# Patient Record
Sex: MALE | Race: WHITE | NOT HISPANIC OR LATINO | Employment: OTHER | URBAN - METROPOLITAN AREA
[De-identification: names, ages, dates, MRNs, and addresses within clinical notes are randomized per-mention and may not be internally consistent; named-entity substitution may affect disease eponyms.]

---

## 2017-05-01 ENCOUNTER — FOLLOW UP (OUTPATIENT)
Dept: URBAN - METROPOLITAN AREA CLINIC 33 | Facility: CLINIC | Age: 82
End: 2017-05-01

## 2017-05-01 VITALS
DIASTOLIC BLOOD PRESSURE: 62 MMHG | HEART RATE: 66 BPM | WEIGHT: 175 LBS | SYSTOLIC BLOOD PRESSURE: 110 MMHG | BODY MASS INDEX: 25.05 KG/M2 | HEIGHT: 70 IN

## 2017-05-01 DIAGNOSIS — H02.833: ICD-10-CM

## 2017-05-01 DIAGNOSIS — H35.3111: ICD-10-CM

## 2017-05-01 DIAGNOSIS — B39.4: ICD-10-CM

## 2017-05-01 DIAGNOSIS — H43.393: ICD-10-CM

## 2017-05-01 DIAGNOSIS — H01.006: ICD-10-CM

## 2017-05-01 DIAGNOSIS — H01.003: ICD-10-CM

## 2017-05-01 DIAGNOSIS — H02.836: ICD-10-CM

## 2017-05-01 DIAGNOSIS — H35.3222: ICD-10-CM

## 2017-05-01 DIAGNOSIS — H04.122: ICD-10-CM

## 2017-05-01 PROCEDURE — 92235 FLUORESCEIN ANGRPH MLTIFRAME: CPT

## 2017-05-01 PROCEDURE — 92014 COMPRE OPH EXAM EST PT 1/>: CPT

## 2017-05-01 PROCEDURE — G8420 CALC BMI NORM PARAMETERS: HCPCS

## 2017-05-01 PROCEDURE — 92250 FUNDUS PHOTOGRAPHY W/I&R: CPT

## 2017-05-01 PROCEDURE — 2019F DILATED MACUL EXAM DONE: CPT

## 2017-05-01 PROCEDURE — 4177F TALK PT/CRGVR RE AREDS PREV: CPT

## 2017-05-01 PROCEDURE — G8427 DOCREV CUR MEDS BY ELIG CLIN: HCPCS

## 2017-05-01 PROCEDURE — 1036F TOBACCO NON-USER: CPT

## 2017-05-01 ASSESSMENT — TONOMETRY
OS_IOP_MMHG: 16
OD_IOP_MMHG: 10

## 2017-05-01 ASSESSMENT — VISUAL ACUITY
OD_CC: 20/15-2
OS_CC: CF 5FT

## 2017-05-03 ENCOUNTER — APPOINTMENT (RX ONLY)
Dept: URBAN - METROPOLITAN AREA CLINIC 123 | Facility: CLINIC | Age: 82
Setting detail: DERMATOLOGY
End: 2017-05-03

## 2017-05-03 DIAGNOSIS — L70.8 OTHER ACNE: ICD-10-CM

## 2017-05-03 DIAGNOSIS — L72.0 EPIDERMAL CYST: ICD-10-CM

## 2017-05-03 DIAGNOSIS — L82.0 INFLAMED SEBORRHEIC KERATOSIS: ICD-10-CM

## 2017-05-03 DIAGNOSIS — L82.1 OTHER SEBORRHEIC KERATOSIS: ICD-10-CM

## 2017-05-03 PROBLEM — M12.9 ARTHROPATHY, UNSPECIFIED: Status: ACTIVE | Noted: 2017-05-03

## 2017-05-03 PROBLEM — I25.10 ATHEROSCLEROTIC HEART DISEASE OF NATIVE CORONARY ARTERY WITHOUT ANGINA PECTORIS: Status: ACTIVE | Noted: 2017-05-03

## 2017-05-03 PROBLEM — D48.5 NEOPLASM OF UNCERTAIN BEHAVIOR OF SKIN: Status: ACTIVE | Noted: 2017-05-03

## 2017-05-03 PROBLEM — E78.5 HYPERLIPIDEMIA, UNSPECIFIED: Status: ACTIVE | Noted: 2017-05-03

## 2017-05-03 PROCEDURE — ? COUNSELING

## 2017-05-03 PROCEDURE — 99203 OFFICE O/P NEW LOW 30 MIN: CPT | Mod: 25

## 2017-05-03 PROCEDURE — 11101: CPT

## 2017-05-03 PROCEDURE — ? TREATMENT REGIMEN

## 2017-05-03 PROCEDURE — ? BIOPSY BY SHAVE METHOD

## 2017-05-03 PROCEDURE — 11100: CPT

## 2017-05-03 ASSESSMENT — LOCATION SIMPLE DESCRIPTION DERM
LOCATION SIMPLE: LEFT CHEEK
LOCATION SIMPLE: RIGHT UPPER BACK
LOCATION SIMPLE: SCALP
LOCATION SIMPLE: LEFT EAR

## 2017-05-03 ASSESSMENT — LOCATION ZONE DERM
LOCATION ZONE: FACE
LOCATION ZONE: TRUNK
LOCATION ZONE: SCALP
LOCATION ZONE: EAR

## 2017-05-03 ASSESSMENT — LOCATION DETAILED DESCRIPTION DERM
LOCATION DETAILED: LEFT INFERIOR PREAURICULAR CHEEK
LOCATION DETAILED: LEFT SUPERIOR MEDIAL MALAR CHEEK
LOCATION DETAILED: LEFT CENTRAL PARIETAL SCALP
LOCATION DETAILED: LEFT ANTERIOR EARLOBE
LOCATION DETAILED: RIGHT MID-UPPER BACK

## 2017-05-03 NOTE — HPI: SKIN LESION
How Severe Is Your Skin Lesion?: mild
Has Your Skin Lesion Been Treated?: not been treated
Is This A New Presentation, Or A Follow-Up?: Growth
Additional History:  has sprayed it multiple times.

## 2017-05-03 NOTE — PROCEDURE: BIOPSY BY SHAVE METHOD
Cryotherapy Text: The wound bed was treated with cryotherapy after the biopsy was performed.
Post-Care Instructions: I reviewed with the patient in detail post-care instructions. Patient is wash site daily with soap and water and apply vaseline until healed.
Consent: Written consent was obtained and risks were reviewed including but not limited to scarring, infection, bleeding, scabbing, incomplete removal, nerve damage and allergy to anesthesia.
Anesthesia Type: 1% lidocaine with 1:100,000 epinephrine and a 1:10 solution of 8.4% sodium bicarbonate
Hemostasis: Drysol
Biopsy Method: Dermablade
Body Location Override (Optional - Billing Will Still Be Based On Selected Body Map Location If Applicable): Left preauricular
Destruction After The Procedure: No
Electrodesiccation And Curettage Text: The wound bed was treated with electrodesiccation and curettage after the biopsy was performed.
Billing Type: [employer]
Electrodesiccation Text: The wound bed was treated with electrodesiccation after the biopsy was performed.
Biopsy Type: H and E
Wound Care: Vaseline
Detail Level: Detailed
Size Of Lesion In Cm: 0
Notification Instructions: Patient will be notified of biopsy results. However, patient instructed to call the office if not contacted within 2 weeks.
Lab: [facility]
Silver Nitrate Text: The wound bed was treated with silver nitrate after the biopsy was performed.
Triangulation (Location Of Lesion In Relation To Distance From Anatomical Landmarks): Bevely Stacks
Lab Facility: [address] [facility]
Anticipated Plan (Based On Presumed Biopsy Results): Nita Lopez
Dressing: bandage
Curettage Text: The wound bed was treated with curettage after the biopsy was performed.
Anesthesia Volume In Cc (Will Not Render If 0): 0.5
Type Of Destruction Used: Curettage
Billing Type: Third-Party Bill
Body Location Override (Optional - Billing Will Still Be Based On Selected Body Map Location If Applicable): Left lateral forehead
Anticipated Plan (Based On Presumed Biopsy Results): Claudetta Oas
Lab Facility: [id number]
Lab: [id number]
Triangulation (Location Of Lesion In Relation To Distance From Anatomical Landmarks): Elinor Dey
Body Location Override (Optional - Billing Will Still Be Based On Selected Body Map Location If Applicable): Left crown
Anticipated Plan (Based On Presumed Biopsy Results): P
Body Location Override (Optional - Billing Will Still Be Based On Selected Body Map Location If Applicable): Right inferior forehead
Anticipated Plan (Based On Presumed Biopsy Results): Courtney Schulte
Triangulation (Location Of Lesion In Relation To Distance From Anatomical Landmarks): Марина Granados
Triangulation (Location Of Lesion In Relation To Distance From Anatomical Landmarks): Dashawn Andrews
Body Location Override (Optional - Billing Will Still Be Based On Selected Body Map Location If Applicable): Right mid eyebrow
Anticipated Plan (Based On Presumed Biopsy Results): Mala Alvarenga

## 2017-05-16 ENCOUNTER — APPOINTMENT (RX ONLY)
Dept: URBAN - METROPOLITAN AREA CLINIC 123 | Facility: CLINIC | Age: 82
Setting detail: DERMATOLOGY
End: 2017-05-16

## 2017-05-16 DIAGNOSIS — L57.0 ACTINIC KERATOSIS: ICD-10-CM

## 2017-05-16 PROCEDURE — 17003 DESTRUCT PREMALG LES 2-14: CPT

## 2017-05-16 PROCEDURE — ? LIQUID NITROGEN

## 2017-05-16 PROCEDURE — 17000 DESTRUCT PREMALG LESION: CPT

## 2017-05-16 PROCEDURE — ? COUNSELING

## 2017-05-16 ASSESSMENT — LOCATION SIMPLE DESCRIPTION DERM
LOCATION SIMPLE: RIGHT FOREHEAD
LOCATION SIMPLE: LEFT FOREHEAD
LOCATION SIMPLE: NOSE

## 2017-05-16 ASSESSMENT — LOCATION DETAILED DESCRIPTION DERM
LOCATION DETAILED: LEFT LATERAL FOREHEAD
LOCATION DETAILED: RIGHT SUPERIOR FOREHEAD
LOCATION DETAILED: NASAL SUPRATIP
LOCATION DETAILED: LEFT SUPERIOR FOREHEAD

## 2017-05-16 ASSESSMENT — LOCATION ZONE DERM
LOCATION ZONE: FACE
LOCATION ZONE: NOSE

## 2017-05-16 NOTE — PROCEDURE: LIQUID NITROGEN
Post-Care Instructions: I reviewed with the patient in detail post-care instructions. Patient is to wear sunprotection, and avoid picking at any of the treated lesions. Pt may apply Vaseline to crusted or scabbing areas.
Duration Of Freeze Thaw-Cycle (Seconds): 0
Render Post-Care Instructions In Note?: no
Consent: The patient's consent was obtained including but not limited to risks of crusting, scabbing, blistering, scarring, darker or lighter pigmentary change, recurrence, incomplete removal and infection.
Detail Level: Simple
Detail Level: Zone

## 2018-04-05 NOTE — PROCEDURE NOTE: CLINICAL
PROCEDURE NOTE: Botox Injection, Face Prior to treatment, the risks/benefits/alternatives were discussed. The patient wished to proceed with procedure. Refer to template for location and amounts of injections. Botox was injected at each site without complications. Lot #: [id number]. Expiration Date: . EXP. Inventory Id: null. Total Units Used: *. Total Units Wasted: *. Patient tolerated procedure well. There were no complications. Post procedure instructions given. Scott Rodriguez

## 2018-04-05 NOTE — PATIENT DISCUSSION
has headaches daily and exhausted always - rec: Dr Rosalie Chavez or [facility] Neurologist.

## 2019-05-23 NOTE — PATIENT DISCUSSION
has headaches daily and exhausted always - rec: Dr Misael Flores or [facility] Neurologist.

## 2019-05-23 NOTE — PROCEDURE NOTE: CLINICAL
PROCEDURE NOTE: Botox Injection, Face OS. Diagnosis: Blepharospasm. Prior to treatment, the risks/benefits/alternatives were discussed. The patient wished to proceed with procedure. Refer to template for location and amounts of injections. Botox was injected at each site without complications. Lot #[id number] Expiration Date: 9/2021. Total Units Used: 50. Inventory Id: null. Total Units Wasted: 50. Patient tolerated procedure well. There were no complications. Post procedure instructions given. Elie Kelsey

## 2019-11-05 NOTE — PROCEDURE NOTE: CLINICAL
PROCEDURE NOTE: Botox Injection, Face Prior to treatment, the risks/benefits/alternatives were discussed. The patient wished to proceed with procedure. Refer to template for location and amounts of injections. Botox was injected at each site without complications. Lot #:[id number] EXP. 04/2022 Total Units Used: 50. Total Units Wasted: 50. Patient tolerated procedure well. There were no complications. Post procedure instructions given. Judy Julian

## 2019-11-05 NOTE — PATIENT DISCUSSION
The risks, benefits, and alternatives to treatment with Botox® were discussed. The patient elects to proceed. Patient elects for medical treatment. Consent signed.  50 units botox injected today by Elsi Song PA-C.

## 2020-11-05 NOTE — PATIENT DISCUSSION
The risks, benefits, and alternatives to treatment with Botox® were discussed. The patient elects to proceed. Patient elects for medical treatment. Consent signed.  50 units botox injected today by Kevin Coreas PA-C.

## 2020-11-05 NOTE — PROCEDURE NOTE: CLINICAL
PROCEDURE NOTE: Neuromodulaters – Botox OS. Diagnosis: Blepharospasm. Risks, benefits and alternatives were discussed. Patient desires to proceed with Botox today. See chart diagram and or plan notes for details and locations. 50 units of Botox were used today and 50 units were discarded. Lot#: [id number]. Exp Date: 06/2023. Garrison Oh

## 2021-02-11 NOTE — PATIENT DISCUSSION
The risks, benefits, and alternatives to treatment with Botox® were discussed. The patient elects to proceed. Patient elects for medical treatment. Consent signed.  50 units botox injected today by Igor Jara PA-C.

## 2021-02-11 NOTE — PROCEDURE NOTE: CLINICAL
PROCEDURE NOTE: Neuromodulaters – Botox OS. Diagnosis: Blepharospasm. Risks, benefits and alternatives were discussed. Patient desires to proceed with Botox today. See chart diagram and or plan notes for details and locations. 50 units of Botox were used today and 50 units were discarded. Lot#: . [id number] EXP Date: 10/2023. Maryana Salmon

## 2021-05-20 NOTE — PATIENT DISCUSSION
The risks, benefits, and alternatives to treatment with Botox® were discussed. The patient elects to proceed. Patient elects for medical treatment. Consent signed.

## 2021-05-20 NOTE — PROCEDURE NOTE: CLINICAL
PROCEDURE NOTE: Neuromodulaters – Botox OS. Diagnosis: Blepharospasm. Risks, benefits and alternatives were discussed. Patient desires to proceed with Botox today. See chart diagram and or plan notes for details and locations. 60 units of Botox were used today and 40 units were discarded. Lot#: [id number]. Exp Date: 8/2023. Vern Sheehan